# Patient Record
Sex: FEMALE | Race: ASIAN | URBAN - METROPOLITAN AREA
[De-identification: names, ages, dates, MRNs, and addresses within clinical notes are randomized per-mention and may not be internally consistent; named-entity substitution may affect disease eponyms.]

---

## 2022-02-22 ENCOUNTER — OFFICE VISIT (OUTPATIENT)
Dept: URBAN - METROPOLITAN AREA CLINIC 26 | Facility: CLINIC | Age: 32
End: 2022-02-22
Payer: COMMERCIAL

## 2022-02-22 DIAGNOSIS — H52.13 MYOPIA, BILATERAL: ICD-10-CM

## 2022-02-22 DIAGNOSIS — H04.123 TEAR FILM INSUFFICIENCY OF BILATERAL LACRIMAL GLANDS: Primary | ICD-10-CM

## 2022-02-22 PROCEDURE — 92015 DETERMINE REFRACTIVE STATE: CPT | Performed by: OPTOMETRIST

## 2022-02-22 PROCEDURE — 92134 CPTRZ OPH DX IMG PST SGM RTA: CPT | Performed by: OPTOMETRIST

## 2022-02-22 PROCEDURE — 92004 COMPRE OPH EXAM NEW PT 1/>: CPT | Performed by: OPTOMETRIST

## 2022-02-22 RX ORDER — LOTEPREDNOL ETABONATE 2.5 MG/ML
0.25 % SUSPENSION/ DROPS OPHTHALMIC
Qty: 5 | Refills: 0 | Status: ACTIVE
Start: 2022-02-22

## 2022-02-22 ASSESSMENT — KERATOMETRY
OD: 43.50
OS: 43.25

## 2022-02-22 ASSESSMENT — VISUAL ACUITY
OD: 20/20
OS: 20/25

## 2022-02-22 ASSESSMENT — INTRAOCULAR PRESSURE
OS: 14
OD: 12

## 2022-02-22 NOTE — IMPRESSION/PLAN
Impression: Myopia, bilateral: H52.13. Plan: srx available. current contact lens wearer. wears monthly lenses. recommend pt switch to daily lenses to improve dry eye.

## 2022-02-22 NOTE — IMPRESSION/PLAN
Impression: Tear film insufficiency of bilateral lacrimal glands: H04.123. Plan: Recommend artificial tears at least 4 times a day and gel drop or tear ointment at bedtime, Omega 3 fatty acids (2-3,000 mg) daily. Start eysuvis QID OU x 1 week, then BID OU x 1 week.